# Patient Record
Sex: MALE | Race: WHITE
[De-identification: names, ages, dates, MRNs, and addresses within clinical notes are randomized per-mention and may not be internally consistent; named-entity substitution may affect disease eponyms.]

---

## 2019-08-19 ENCOUNTER — HOSPITAL ENCOUNTER (EMERGENCY)
Dept: HOSPITAL 74 - JER | Age: 54
Discharge: HOME | End: 2019-08-19
Payer: COMMERCIAL

## 2019-08-19 VITALS — SYSTOLIC BLOOD PRESSURE: 133 MMHG | TEMPERATURE: 98.8 F | DIASTOLIC BLOOD PRESSURE: 78 MMHG | HEART RATE: 87 BPM

## 2019-08-19 VITALS — BODY MASS INDEX: 34.6 KG/M2
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
## 2019-08-19 DIAGNOSIS — Z04.1: Primary | ICD-10-CM

## 2019-08-19 DIAGNOSIS — G25.0: ICD-10-CM

## 2019-08-19 DIAGNOSIS — E78.5: ICD-10-CM
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
## 2019-08-19 DIAGNOSIS — F41.9: ICD-10-CM

## 2019-08-19 DIAGNOSIS — E03.9: ICD-10-CM
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
## 2019-08-20 NOTE — EKG
Test Reason : 

Blood Pressure : ***/*** mmHG

Vent. Rate : 080 BPM     Atrial Rate : 080 BPM

   P-R Int : 140 ms          QRS Dur : 086 ms

    QT Int : 388 ms       P-R-T Axes : 043 -04 027 degrees

   QTc Int : 447 ms

 

NORMAL SINUS RHYTHM

NORMAL ECG

NO PREVIOUS ECGS AVAILABLE

Confirmed by MD CHYNA, ELSIE ([id number]) on 8/20/2019 4:17:03 PM

 

Referred By:             Confirmed By:ELSIE CLEMENTE MD